# Patient Record
Sex: MALE | Race: WHITE | NOT HISPANIC OR LATINO | Employment: FULL TIME | URBAN - NONMETROPOLITAN AREA
[De-identification: names, ages, dates, MRNs, and addresses within clinical notes are randomized per-mention and may not be internally consistent; named-entity substitution may affect disease eponyms.]

---

## 2017-10-13 ENCOUNTER — TRANSFERRED RECORDS (OUTPATIENT)
Dept: HEALTH INFORMATION MANAGEMENT | Facility: HOSPITAL | Age: 22
End: 2017-10-13

## 2017-10-13 ENCOUNTER — HOSPITAL ENCOUNTER (INPATIENT)
Age: 22
End: 2017-10-13
Payer: MEDICAID

## 2017-10-13 RX ORDER — HYDROXYZINE HYDROCHLORIDE 25 MG/1
25-50 TABLET, FILM COATED ORAL EVERY 4 HOURS PRN
Status: CANCELLED | OUTPATIENT
Start: 2017-10-13

## 2017-10-13 RX ORDER — TRAZODONE HYDROCHLORIDE 50 MG/1
50 TABLET, FILM COATED ORAL
Status: CANCELLED | OUTPATIENT
Start: 2017-10-13

## 2017-10-13 RX ORDER — ACETAMINOPHEN 325 MG/1
650 TABLET ORAL EVERY 4 HOURS PRN
Status: CANCELLED | OUTPATIENT
Start: 2017-10-13

## 2017-10-13 RX ORDER — OLANZAPINE 10 MG/2ML
10 INJECTION, POWDER, FOR SOLUTION INTRAMUSCULAR 3 TIMES DAILY PRN
Status: CANCELLED | OUTPATIENT
Start: 2017-10-13

## 2017-10-13 RX ORDER — OLANZAPINE 10 MG/1
10 TABLET ORAL 3 TIMES DAILY PRN
Status: CANCELLED | OUTPATIENT
Start: 2017-10-13

## 2017-10-13 RX ORDER — ALUMINA, MAGNESIA, AND SIMETHICONE 2400; 2400; 240 MG/30ML; MG/30ML; MG/30ML
30 SUSPENSION ORAL EVERY 4 HOURS PRN
Status: CANCELLED | OUTPATIENT
Start: 2017-10-13

## 2017-10-14 ENCOUNTER — HOSPITAL ENCOUNTER (INPATIENT)
Facility: HOSPITAL | Age: 22
LOS: 4 days | Discharge: HOME | End: 2017-10-18
Attending: PSYCHIATRY & NEUROLOGY | Admitting: PSYCHIATRY & NEUROLOGY
Payer: MEDICAID

## 2017-10-14 ENCOUNTER — TRANSFERRED RECORDS (OUTPATIENT)
Dept: HEALTH INFORMATION MANAGEMENT | Facility: HOSPITAL | Age: 22
End: 2017-10-14

## 2017-10-14 DIAGNOSIS — F33.1 MAJOR DEPRESSIVE DISORDER, RECURRENT EPISODE, MODERATE (H): Primary | ICD-10-CM

## 2017-10-14 PROBLEM — R45.89 SUICIDAL BEHAVIOR: Status: ACTIVE | Noted: 2017-10-14

## 2017-10-14 RX ORDER — TRAZODONE HYDROCHLORIDE 50 MG/1
50 TABLET, FILM COATED ORAL
Status: DISCONTINUED | OUTPATIENT
Start: 2017-10-14 | End: 2017-10-18 | Stop reason: HOSPADM

## 2017-10-14 RX ORDER — OLANZAPINE 10 MG/2ML
10 INJECTION, POWDER, FOR SOLUTION INTRAMUSCULAR
Status: DISCONTINUED | OUTPATIENT
Start: 2017-10-14 | End: 2017-10-18 | Stop reason: HOSPADM

## 2017-10-14 RX ORDER — HYDROXYZINE HYDROCHLORIDE 25 MG/1
25-50 TABLET, FILM COATED ORAL EVERY 4 HOURS PRN
Status: DISCONTINUED | OUTPATIENT
Start: 2017-10-14 | End: 2017-10-18 | Stop reason: HOSPADM

## 2017-10-14 RX ORDER — BISACODYL 10 MG
10 SUPPOSITORY, RECTAL RECTAL DAILY PRN
Status: DISCONTINUED | OUTPATIENT
Start: 2017-10-14 | End: 2017-10-18 | Stop reason: HOSPADM

## 2017-10-14 RX ORDER — ACETAMINOPHEN 325 MG/1
650 TABLET ORAL EVERY 4 HOURS PRN
Status: DISCONTINUED | OUTPATIENT
Start: 2017-10-14 | End: 2017-10-18 | Stop reason: HOSPADM

## 2017-10-14 RX ORDER — OLANZAPINE 10 MG/1
10 TABLET ORAL
Status: DISCONTINUED | OUTPATIENT
Start: 2017-10-14 | End: 2017-10-18 | Stop reason: HOSPADM

## 2017-10-14 RX ORDER — ALUMINA, MAGNESIA, AND SIMETHICONE 2400; 2400; 240 MG/30ML; MG/30ML; MG/30ML
30 SUSPENSION ORAL EVERY 4 HOURS PRN
Status: DISCONTINUED | OUTPATIENT
Start: 2017-10-14 | End: 2017-10-18 | Stop reason: HOSPADM

## 2017-10-14 ASSESSMENT — ACTIVITIES OF DAILY LIVING (ADL)
LAUNDRY: UNABLE TO COMPLETE
RETIRED_EATING: 0-->INDEPENDENT
TRANSFERRING: 0-->INDEPENDENT
BATHING: 0-->INDEPENDENT
GROOMING: INDEPENDENT
FALL_HISTORY_WITHIN_LAST_SIX_MONTHS: NO
COGNITION: 0 - NO COGNITION ISSUES REPORTED
SWALLOWING: 0-->SWALLOWS FOODS/LIQUIDS WITHOUT DIFFICULTY
DRESS: 0-->INDEPENDENT
TOILETING: 0-->INDEPENDENT
AMBULATION: 0-->INDEPENDENT
RETIRED_COMMUNICATION: 0-->UNDERSTANDS/COMMUNICATES WITHOUT DIFFICULTY
ORAL_HYGIENE: INDEPENDENT
DRESS: SCRUBS (BEHAVIORAL HEALTH)

## 2017-10-14 NOTE — PLAN OF CARE
"ADMISSION NOTE    Reason for admission Suicidal Ideation.  Safety concerns OD on ibuprofen.  Risk for or history of violence None reported or noted.   Full skin assessment: Completed, skin clear, intact with exception of Tattoos and small dime of superficial open area on lower back.    Patient arrived on unit from [facility] accompanied by security and transport on 10/14/2017  2:37 PM.   Status on arrival: Ambulatory, pleasant and cooperative.   /62  Pulse 55  Temp 98.5  F (36.9  C) (Tympanic)  Resp 16  Ht 2.108 m (6' 11\")  Wt 67.2 kg (148 lb 1.6 oz)  SpO2 98%  BMI 15.11 kg/m2  Patient given tour of unit and Welcome to  unit papers given to patient, wanding completed, belongings inventoried, and admission assessment initiated.   Patient's legal status on arrival is Voluntary. Appropriate legal rights discussed with and copy given to patient. Patient Bill of Rights discussed with and copy given to patient.   Patient denies SI, HI, and thoughts of self harm and contracts for safety while on unit.      MIKEY FLORES  10/14/2017  3:14 PM          "

## 2017-10-14 NOTE — IP AVS SNAPSHOT
MRN:[id number]                      After Visit Summary   10/14/2017    Ivan Riley    MRN: [id number]           Thank you!     Thank you for choosing [facility] for your care. Our goal is always to provide you with excellent care. Hearing back from our patients is one way we can continue to improve our services. Please take a few minutes to complete the written survey that you may receive in the mail after you visit with us. Thank you!        Patient Information     Date Of Birth          1995        Designated Caregiver       Most Recent Value    Caregiver    Will someone help with your care after discharge? no      About your hospital stay     You were admitted on:  October 14, 2017 You last received care in the:  [facility]    You were discharged on:  October 18, 2017       Who to Call     For medical emergencies, please call 911.  For non-urgent questions about your medical care, please call your primary care provider or clinic, None          Attending Provider     Provider Specialty    Ji Rosenthal MD Psychiatry    [place], Brittney Aragon NP Psychiatry       Primary Care Provider    Physician No Ref-Primary      Further instructions from your care team       Behavioral Discharge Planning and Instructions    Summary: Hesham was admitted to  with increased suicidal ideation     Main Diagnosis:  Mdd, reccurent, severe without psychotic features, Adjustment disorder with depression and anxiety    Major Treatments, Procedures and Findings: Stabilize with medications, connect with community programs.    Symptoms to Report: feeling more aggressive, increased confusion, losing more sleep, mood getting worse or thoughts of suicide    Lifestyle Adjustment: Take all medications as prescribed, meet with doctor/ medication provider, out patient therapist, , and [facility] worker as scheduled. Abstain from alcohol or any unprescribed drugs.    Psychiatry Follow-up:     [facility]  Dr. Baltazar -   [phone] Fax: [phone]    [facility]  ARM -   [address] [place] [address]  [phone]  Fax: [phone]    [facility] - Open Access/Walk ins Monday 1-3:30 Tuesday/Thursday 8:30-11:30 Wednesday 12:30-3:30  Therapy - Ji Cowan   [facility]  [address] [place] [address]  Phone:  [phone]  Fax: [phone]      Resources:   Crisis Intervention: 815.987.5629 or 172-136-2123 (TTY: 462.124.8802).  Call anytime for help.  National [place] on Mental Illness (www.mn.lili.org): 109.523.5633 or 578-353-3628.  Alcoholics Anonymous (www.alcoholics-anonymous.org): Check your phone book for your local chapter.  Suicide Awareness Voices of Education (SAVE) (www.save.org): 708-639-QVXU (5135)  National Suicide Prevention Line (www.mentalhealthmn.org): 796-824-ZYVQ (8174)  Mental Health Consumer/Survivor Network of [place] (www.mhcsn.net): 244.751.8498 or 190-630-0075  Mental Health Association of [place] (www.mentalhealth.org): 411.264.9032 or 366-307-5813    General Medication Instructions:   See your medication sheet(s) for instructions.   Take all medicines as directed.  Make no changes unless your doctor suggests them.   Go to all your doctor visits.  Be sure to have all your required lab tests. This way, your medicines can be refilled on time.  Do not use any drugs not prescribed by your doctor.  Avoid alcohol.    [place] Area:  [facility], Crisis stabilization [facility]- [phone]  [facility] Crisis Line: 8-305-965-0658  Advocates For Family Peace: 152-5479  Sexual Assault Program [facility]: 742.372.7714 or 1-977.629.3783  [place] Battered Women's Program: 5-047-434-8166 Ext: 279       Calls answered Mon-Fri-8:00 am--4:30 pm    [place]:  Advocates for Family Peace: 1-481.182.1547  [facility] first call for help: 1-341.912.3392  [facility]:  (776) 928-1319      [place] Area:  Warm Line: 1-357.326.2204        "Calls answered Tuesday--Saturday 4:00 pm--10:00 pm  [facility] Crisis Line - 271-064-6323  [place] [facility]abil[facility] 108-313-9838    [place] Statewide:  [place] Crisis and Referral Services: 1-204.140.1279  National Suicide Prevention Lifeline: 2-170-663-TALK (3120)   - tlq9ugba- Text  Life  to [address]  First Call for Help:   [facility] Helpline- 8-332-GHLX-HELP      Pending Results     No orders found from 10/12/2017 to 10/15/2017.            Statement of Approval     Ordered          10/17/17 1502  I have reviewed and agree with all the recommendations and orders detailed in this document.  EFFECTIVE NOW     Approved and electronically signed by:  Christiano Cowan NP             Admission Information     Date & Time Department Dept. Phone    10/14/2017 [facility] [phone]      Your Vitals Were     Blood Pressure Pulse Temperature Respirations Height Weight    111/51 55 97.7  F (36.5  C) (Tympanic) 12 2.108 m (6' 11\") 66.6 kg (146 lb 13.2 oz)    Pulse Oximetry BMI (Body Mass Index)                98% 14.98 kg/m2          MyChart Information     [employer] lets you send messages to your doctor, view your test results, renew your prescriptions, schedule appointments and more. To sign up, go to [email or web address] . Click on \"Log in\" on the left side of the screen, which will take you to the Welcome page. Then click on \"Sign up Now\" on the right side of the page.     You will be asked to enter the access code listed below, as well as some personal information. Please follow the directions to create your username and password.     Your access code is: [email or web address]  Expires: 2018  9:22 AM     Your access code will  in 90 days. If you need help or a new code, please call your [facility] clinic or [phone].        Care EveryWhere ID     This is your Care EveryWhere ID. This could be used by other organizations to access your [facility] medical records  [id number]        Equal Access to Services     " LINH GORDON : Hadii aad ku janak Joyce, waaxda luqadaha, qaybta kaalmada adejocelyne, waxsimon abdoul duartetellygail arias. So [facility] [phone].    ATENCIÓN: Si habla español, tiene a yoo disposición servicios gratuitos de asistencia lingüística. Llame al [phone].    We comply with applicable federal civil rights laws and [place] laws. We do not discriminate on the basis of race, color, national origin, age, disability, sex, sexual orientation, or gender identity.               Review of your medicines      START taking        Dose / Directions    FLUoxetine 20 MG capsule   Commonly known as:  PROzac   Used for:  Major depressive disorder, recurrent episode, moderate (H)        Dose:  20 mg   Take 1 capsule (20 mg) by mouth daily   Quantity:  30 capsule   Refills:  0            Where to get your medicines      These medications were sent to [facility] - [facility] [address]  [address] [facility] [address]     Phone:  [phone]     FLUoxetine 20 MG capsule                Protect others around you: Learn how to safely use, store and throw away your medicines at www.disposemymeds.org.             Medication List: This is a list of all your medications and when to take them. Check marks below indicate your daily home schedule. Keep this list as a reference.      Medications           Morning Afternoon Evening Bedtime As Needed    FLUoxetine 20 MG capsule   Commonly known as:  PROzac   Take 1 capsule (20 mg) by mouth daily   Last time this was given:  20 mg on 10/18/2017  8:09 AM

## 2017-10-14 NOTE — PLAN OF CARE
Face to face end of shift report received from Patricia DOBSON RN. Rounding completed. Patient observed in room awake.     Solange Marsh  10/14/2017  3:54 PM

## 2017-10-14 NOTE — IP AVS SNAPSHOT
[facility]    [address] [place] [address]    Phone:  [phone]    Fax:  [phone]                                       After Visit Summary   10/14/2017    Ivan Riley    MRN: [id number]           After Visit Summary Signature Page     I have received my discharge instructions, and my questions have been answered. I have discussed any challenges I see with this plan with the nurse or doctor.    ..........................................................................................................................................  Patient/Patient Representative Signature      ..........................................................................................................................................  Patient Representative Print Name and Relationship to Patient    ..................................................               ................................................  Date                                            Time    ..........................................................................................................................................  Reviewed by Signature/Title    ...................................................              ..............................................  Date                                                            Time

## 2017-10-14 NOTE — PROVIDER NOTIFICATION
10/14/17 1456   Patient Belongings   Did you bring any home meds/supplements to the hospital?  No   Patient Belongings cell phone/electronics;clothing;money (see comment);shoes;wallet   Disposition of Belongings Other (see comment)   Belongings Search Yes   Clothing Search Yes   Second Staff Abel   General Info Comment 1 pair of black tenners, 1 green t shirt, 1 plaid shirt, 1 brown zip up hoodie, 12 pairs of sox,1 belt, 1 black hoodie, 6 pair of boxers, 1 red t shirt,1 brown hoodie, 2 pair of faded glory jeans, 1 pair of black jeans, 1 black sweat shirt, 1 green t shirt, 1 black t shirt, 1 grey t shirt    List items sent to safe: 1 cell phone and , 1 wallet, 19 cash,1 [employer] card, 1 visa debit card, 1 visa [place] bank card, another cell phone.one cell phone has a scrathed face  All other belongings put in assigned cubby in belongings room.     I have reviewed my belongings list on admission and verify that it is correct.     Patient signature_______________________________    Second staff witness (if patient unable to sign) ______________________________       I have received all my belongings at discharge.    Patient signature________________________________    Julissa   10/14/2017  3:00 PM

## 2017-10-15 PROCEDURE — 99223 1ST HOSP IP/OBS HIGH 75: CPT | Performed by: NURSE PRACTITIONER

## 2017-10-15 RX ADMIN — FLUOXETINE 20 MG: 20 CAPSULE ORAL at 12:05

## 2017-10-15 ASSESSMENT — ACTIVITIES OF DAILY LIVING (ADL)
DRESS: SCRUBS (BEHAVIORAL HEALTH)
LAUNDRY: UNABLE TO COMPLETE
LAUNDRY: UNABLE TO COMPLETE
ORAL_HYGIENE: INDEPENDENT
GROOMING: INDEPENDENT
GROOMING: INDEPENDENT
ORAL_HYGIENE: INDEPENDENT
DRESS: SCRUBS (BEHAVIORAL HEALTH)

## 2017-10-15 NOTE — PLAN OF CARE
Problem: Patient Care Overview  Goal: Plan of Care/Patient Progress Review  Outcome: No Change  Patient calm and cooperative with nursing assessment and finishing his admission documents.  He has a sad, flat affect and is tearful at times.  States he does not have much to live for as he does not currently have a home and does not see his 18 month old son.  He does think things would improve if he had his own place and a steady income.  States his depression has been getting worse but is not currently having thoughts of suicide.  Feels he will be safe while on the unit.  Patient states he is not currently taking any medications but has been prescribed antidepressants in the past. Denies any hallucinations and has some issues with anxiety.  [facility].  Reports he has a history of self-injury but has not had those impulses in a long time.  VS WNL.  Denies any pain.

## 2017-10-15 NOTE — PLAN OF CARE
Face to face end of shift report received from Solange PULIDO. Rounding completed. Patient observed.     Joaquina Head  10/14/2017  11:22 PM

## 2017-10-15 NOTE — PLAN OF CARE
Face to face end of shift report received from Patricia DOBSON RN. Rounding completed. Patient observed in group room.     Solange Marsh  10/15/2017  3:50 PM

## 2017-10-15 NOTE — PLAN OF CARE
Problem: Patient Care Overview  Goal: Plan of Care/Patient Progress Review  Pt pleasant and cooperative during nurse assessment. Appears blunt at times but is social with peers and staff. Laughs and smiles accordingly. Patient participating in groups and is compliant with ordered medications. Prozac handout with explanation given. EKG performed without difficulties, waiting final results. Denies chest pain or palpitations but states has irregular heart rhythm. Denies pain or thoughts to hurt self. Will continue to monitor.

## 2017-10-15 NOTE — H&P
"Psychiatric Eval/H&P    Patient Name: Ivan Riley   YOB: 1995  Age: 21 year old  [id number]    Primary Physician: No Ref-Primary, Physician   Completed by VALENTINA Gambino  : None  ARHMS: None  Primary psychiatrist/NP: None  Therapist: None  Family contact: Mother Vicenta [phone]          CC: \"I haven't been able to see my son and I don't     HPI   Ivan Riley is a 21 year old never   male who Overdose on 100, 200 mg tablets of ibuprofen. She was monitored in the emergency room and they contacted poison control for clearance.    He is depressed and hopeless he feels he has nothing to live for.  He is not able to see his 18-month-old son.  He has a history of self-injurious behavior.  He used to cut his arms to relieve stress  Though he has not done that in many years.  He states that he has had stressors lately.   He has not been able to find a job  That is steady Enough to pay child support.  The last time he went to try to see his son states \"before she would let me see him, she drug to all of her male friends houses\" he is aware that this was likely to induce jealousy. He states that he is typically not depressed and that he is never had suicidal thoughts though When he and his girlfriend broke up h started.  It is now been going on since June. Patient has had              depressed mood (sad empty hopeless tearful),               ahedonia              low energy              Worthlessness And guilt              concentration decrease              Suicidal thoughts with plan action and intent     He states he has been feeling guilty because his ex girlfriend posted something on [employer] about how he is a \"deadbeat dad\"  [facility]     Past Psychiatric History: At the age of 17 he did see a therapist after he ran away.     Social History: He was born in [place] though at the age of 1-1/2 he states his mother  Left his father and came to [place] to  Be away " "from him.  He has not seen his father since.  He states that he has no history   He does have a 13-year-old half-sister.  He states that he was also raised by his maternal grandparents.  When He moved to [place]  He and his mother lived in a trailer that was on the same land as their grandparents home. He states that he was very close to his  Grandparents and still is.  At the age of 14 he and his mother moved into their own home.  He did graduate high school and attended some college for carpentry but did not complete a degree.  He states he is very involved in music and plays the guitar and piano.  He states that at the age of 16 he ran away for a week because he did not want to go to school because he was bullied. He states that he is still very close to his mother.   He does have an ex-girlfriend that he was with for 3 years.  He has an 49-ytlie-dfx son with her.  They live in [place]. 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    Chemical Use History: He has never Been intoxicated or used drugs     Family Psychiatric History: Cousin did commit suicide     Medical History and ROS    \"I have been told that I had something wrong with my heart an echocardiogram\"    No current outpatient prescriptions on file.     No Known Allergies  No past medical history on file.  No past surgical history on file.    Current Medications None  No current outpatient prescriptions on file.       Past Psychiatric Medications Tried:He states that he tried Prozac when he was 19  And My girlfriend's family said I was much better when I was on it and acted much less depressed.    Physical Exam    Constitutional: oriented to person, place, and time.  appears well-developed and well-nourished.   HENT:   Head: Normocephalic and atraumatic.   Right Ear: External ear normal.   Left Ear: External ear normal.   Nose: Nose normal.   Mouth/Throat: Oropharynx is clear and moist. No oropharyngeal " "exudate.   Eyes: Conjunctivae and EOM are normal. Pupils are equal, round, and reactive to light. Right eye exhibits no discharge. Left eye exhibits no discharge. No scleral icterus.   Neck: Normal range of motion. Neck supple. No JVD present. No tracheal deviation present. No thyromegaly present.   Cardiovascular: irregular rhythm and bradycardic,  intact distal pulses. Exam reveals no gallop and no friction rub.   No murmur heard.  Pulmonary/Chest: Effort normal and breath sounds normal. No stridor. No respiratory distress.  no wheezes. no rales. no tenderness.   Abdominal: Soft. Bowel sounds are normal.  no distension and no mass. There is no tenderness. There is no rebound and no guarding.  Skin: Dry, intact, no open areas, rashes, moles of concern    Review of Systems:  Constitution: No weight loss, fever, night sweats  Skin: No rashes, pruritus or open wounds  Neuro: No headaches or seizure activity.  Psych:  See HPI  Eyes: No vision changes.  ENT: No problems chewing or swallowing.   Musculoskeletal: No muscle pain, joint pain or swelling   Respiratory: No cough or dyspnea  Cardiovascular:  No chest pain,  palpitations or fainting  Gastrointestinal:  No abdominal pain, nausea, vomiting or change in bowel habits         MSE/PSYCH  PSYCHIATRIC EXAM  BP (!) 115/49  Pulse 56  Temp 98.5  F (36.9  C) (Tympanic)  Resp 12  Ht 2.108 m (6' 11\")  Wt 66.6 kg (146 lb 13.2 oz)  SpO2 97%  BMI 14.98 kg/m2  -Appearance/Behavior: Well-groomed  -Motor: Intact  -Gait:Intact  -Abnormal involuntary movements: None  -Mood: Sad dysphoric  -Affect: Dysphoric  -Speech: Regular low monotone       -Thought process/associations: Logical.  -Thought content: no delusions  -Perceptual disturbances: No hallucinations..              -Suicidal/Homicidal Ideation:   -Judgment: Poor.  -Insight: Fair  *Orientation: time, place and person.  *Memory: Intact  *Attention: Good  *Language: fluent, no aphasias, able to repeat phrases and name " objects. Vocab intact.  *Fund of information: appropriate for education  *Cognitive functioning estimate: 0 - independent.     Labs: cmp did show elevated sodium. lfts were normal. Drug screen negative             Alcohol negative,             ekg showed prolonged qt though it is now improved     Assessment/Impression: This is a 21 year old yo male with Adjustment disorder as well as depression.  He was having suicidal thoughts and did attempt to overdose on ibuprofen.  This was his first suicide attempt.  This is the first time he has been on a mental health unit.  Educated regarding medication indications, risks, benefits, side effects, contraindications and possible interactions. Verbally expressed understanding.     DX:     Mdd, reccurent, severe without psychotic features  Adjustment disorder with depression and anxiety       Plan:     prozac 20 mg daily. States he tried it in past and family had told him he looked much better on it  Vitamin D level  EKG will be ordered  Prior to DC I would like to set him up with echocardiogram. He has been recommended to have one on multiple occasions though has never made the appt.      Anticipated length of stay 5 days

## 2017-10-15 NOTE — PLAN OF CARE
Face to face end of shift report received from TESS Kunz. Rounding completed. Patient observed in dayroom.     MIKEY FLORES  10/15/2017  7:50 AM

## 2017-10-16 DIAGNOSIS — F32.1 MAJOR DEPRESSIVE DISORDER, SINGLE EPISODE, MODERATE (H): Primary | ICD-10-CM

## 2017-10-16 PROCEDURE — 82306 VITAMIN D 25 HYDROXY: CPT | Performed by: NURSE PRACTITIONER

## 2017-10-16 PROCEDURE — 36415 COLL VENOUS BLD VENIPUNCTURE: CPT | Performed by: NURSE PRACTITIONER

## 2017-10-16 PROCEDURE — 12400000 ZZH R&B MH

## 2017-10-16 PROCEDURE — 99232 SBSQ HOSP IP/OBS MODERATE 35: CPT | Performed by: NURSE PRACTITIONER

## 2017-10-16 RX ADMIN — FLUOXETINE 20 MG: 20 CAPSULE ORAL at 08:21

## 2017-10-16 ASSESSMENT — ACTIVITIES OF DAILY LIVING (ADL)
ORAL_HYGIENE: INDEPENDENT
DRESS: INDEPENDENT;SCRUBS (BEHAVIORAL HEALTH)
DRESS: INDEPENDENT
GROOMING: INDEPENDENT
ORAL_HYGIENE: INDEPENDENT
LAUNDRY: UNABLE TO COMPLETE
LAUNDRY: UNABLE TO COMPLETE
GROOMING: INDEPENDENT

## 2017-10-16 NOTE — PLAN OF CARE
Problem: Patient Care Overview  Goal: Plan of Care/Patient Progress Review  Patient will deny suicidal thoughts by discharge.  Patient will attend 75% of group programing.  Patient will verbalize decrease in depression.   Outcome: Improving  Patient has been calm and cooperative this shift.  No medications given this shift.  He has been attending groups and is social with peers and staff in the [facility].  He states he is still feeling depressed and has some anxiety but denies any thoughts of suicide at this time.  Still awaiting  results from EKG that was completed this morning.  Patient has not complaints of pain and VS are stable.  Will continue to monitor.

## 2017-10-16 NOTE — PLAN OF CARE
Face to face end of shift report received from Loco DAS RN. Rounding completed. Patient observed in [facility].     Solange Marsh  10/16/2017  3:58 PM

## 2017-10-16 NOTE — PLAN OF CARE
Problem: Patient Care Overview  Goal: Team Discussion  Team Plan:   Outcome: No Change  BEHAVIORAL TEAM DISCUSSION     Participants: Lg Taylor RN, Leyla Mejia RT, Carolyn Ordonez OT, Brittney Jones NP, Paula Pena NP, Miguelina Casillas SW, Amee Powell SW  Progress: Engaging with staff, cooperative, attending groups  Continued Stay Criteria/Rationale: serious suicide attempt - stabilizing suicidal ideation with medication changes  Medical/Physical: History of heart issues  Precautions:   Behavioral Orders   Procedures     Code 1 - Restrict to Unit     Routine Programming       As clinically indicated     Status 15       Every 15 minutes.     Plan: Stabilize on medications - can return home once stable with services, possible referral for PHP  Rationale for change in precautions or plan: None

## 2017-10-16 NOTE — PLAN OF CARE
Face to face end of shift report received from Solange PULIDO. Rounding completed. Patient observed.     Joaquina Head  10/15/2017  11:26 PM

## 2017-10-16 NOTE — PLAN OF CARE
"Problem: Patient Care Overview  Goal: Plan of Care/Patient Progress Review  Patient will deny suicidal thoughts by discharge.  Patient will attend 75% of group programing.  Patient will verbalize decrease in depression.   Pt denies SI, HI, and hallucinations. He is calm and cooperative with nursing assessment. Affect is blunted and flat. Sarabjit anxiety and depression. Feels like he is ready to go home. Wants to get back to work. Rates depression as \"a little bit.\" Denies pain. Has been up on the unit socializing with peers and attending groups. He is compliant with prescribed medication. Will continue to monitor.        "

## 2017-10-16 NOTE — PLAN OF CARE
Face to face end of shift report received from TESS Kunz. Rounding completed. Patient observed in bed.     Adrienne Marie  10/16/2017  7:47 AM

## 2017-10-16 NOTE — PLAN OF CARE
"Social Service Psychosocial Assessment  Presenting Problem:  Ivan is a single, 21 year-old,  male who presented to [facility] ED following an overdose attempt.  According to admission notes, \"Pt reports that he took around 100 over the counter ibuprofen tablets at around 0100. Pt continues to endorse SI and appear Pt denies drug and alcohol use. Labs are clear and pt is medically stable. Pt is voluntary.\"  Marital Status:   Single, never   Spouse / Children:  Has an 18 may-old son has not been able to see.  Psychiatric TX HX: History of depression.   Suicide Risk Assessment: On admission pt reported attempted overdose.  Today pt denies SI.   Pt has a history of SIBs but cutting but states has not done this in a long time and denies any prior attempts.   Access to Lethal Means (explain): Denies access to firearms.   Family Psych HX: A cousin committed suicide. Pt stated that his mother has some issues but unsure of any diagnosis.  Pt denies any substance use issues in the family.   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   Living Situation:  Lives with a roommate in [place].  ADL s: independent  Education: Graduated high school, some college  Financial Situation:  No source of income- does not want to be on general assistance  Occupation: Unemployed  Leisure & Recreation: Playing guitar, piano, drums and skateboarding.   Childhood History:Pt was born in [place] and lived there until he was 1 1/2 when his mother left his father and brought him to [place].  He has not had any contact with his father since then.  Has a 13 year-old half sister. He lived with his mom in a trailer house on his maternal grandparents " land until he was 14 when they moved into their own house. Pt reports being close to his mother.   Trauma Abuse HX: Pt reported he was bullied in school and ran away from home for a week when he was 16 year-old because he was being bullied.  Relationship / Sexuality:  No current relationship  Substance Use/ Abuse: Pt denies ever using drugs or drinking.   Chemical Dependency Treatment HX:  none  Legal Issues: denies  Significant Life Events:  Was target of severe bullying in school.   Strengths:  Willing to accept help, hard worker  Challenges /Limitation:  Depression symptoms,   Patient Support Contact (Include name, relationship, number, and summary of conversation):     Interventions:      Community-Based Programs: Will do [facility] referral    Medical/Dental Care: Refer to Dr. Baltazar for PCP    Medication Management: PCP    Individual Therapy: Wants to see Ji Cowan at [facility] counseling again.     Suicide Risk Assessment  On admission pt reported attempted overdose.  Today pt denies SI.   Pt has a history of SIBs but cutting but states has not done this in a long time and denies any prior attempts.     High Risk Safety Plan: Talk to supports; Call crisis lines; Go to local ER if feeling suicidal.

## 2017-10-16 NOTE — PLAN OF CARE
Problem: Patient Care Overview  Goal: Individualization & Mutuality  Pt appeared to be sleeping throughout this shift, normal respirations and position changes noted.

## 2017-10-17 LAB — DEPRECATED CALCIDIOL+CALCIFEROL SERPL-MC: 34 UG/L (ref 20–75)

## 2017-10-17 PROCEDURE — 99238 HOSP IP/OBS DSCHRG MGMT 30/<: CPT | Performed by: NURSE PRACTITIONER

## 2017-10-17 PROCEDURE — 25000132 ZZH RX MED GY IP 250 OP 250 PS 637: Performed by: NURSE PRACTITIONER

## 2017-10-17 PROCEDURE — 12400000 ZZH R&B MH

## 2017-10-17 RX ADMIN — FLUOXETINE 20 MG: 20 CAPSULE ORAL at 08:18

## 2017-10-17 ASSESSMENT — ACTIVITIES OF DAILY LIVING (ADL)
GROOMING: INDEPENDENT
GROOMING: INDEPENDENT
ORAL_HYGIENE: INDEPENDENT
LAUNDRY: UNABLE TO COMPLETE
DRESS: SCRUBS (BEHAVIORAL HEALTH);INDEPENDENT
LAUNDRY: UNABLE TO COMPLETE
DRESS: INDEPENDENT;SCRUBS (BEHAVIORAL HEALTH)
ORAL_HYGIENE: INDEPENDENT

## 2017-10-17 NOTE — PLAN OF CARE
Face to face end of shift report received from Adrienne DAS RN. Rounding completed. Patient observed in [facility].    Pam Grayson  10/17/2017  3:36 PM

## 2017-10-17 NOTE — PLAN OF CARE
Problem: Patient Care Overview  Goal: Discharge Needs Assessment  Writer checked in with pt who stated he was doing better.  Writer asked him if he would be interested in going to the [facility] program at [facility] three days a week?  Pt stated his main priority is to find a ful ltime job and he was unsure if he would be able to do the program unless he could work it around his work hours.  Writer told him that we have not been able to set up any follow up appointments because his insurance is not active yet- he stated that pt financial met with him again today to get him signed up.  Writer told him that all the phone numbers for follow up appointments will be on his discharge plan and if he doesn't have insurance before discharge he will have to call and set up his appointments.  Pt stated he could do that- he is used to setting up his own appointments.  Pt was doing a puzzle which he stated was the same one he did with his grandmother before his grandmother passed away. Pt was attending groups, bright and socializing with the other pts on the unit.  He denied any SI.

## 2017-10-17 NOTE — PLAN OF CARE
Problem: Patient Care Overview  Goal: Plan of Care/Patient Progress Review  Patient will deny suicidal thoughts by discharge.  Patient will attend 75% of group programing.  Patient will verbalize decrease in depression.   Outcome: Improving  Patient has been calm and cooperative.  Continues to have a flat affect and appears sad.  Denies any thoughts of suicide this shift. States he is still depressed but it is better then when he was admitted here.  He attended groups and was social with peers on the unit. Pulse and blood pressure WNL this shift.  Denies any pain.  Will continue to monitor.

## 2017-10-17 NOTE — PLAN OF CARE
Face to face end of shift report received from TESS Heredia. Rounding completed. [facility] in [facility].     Adrienne Marie  10/17/2017  7:48 AM

## 2017-10-17 NOTE — PLAN OF CARE
Face to face end of shift report received from Solange JORDAN RN. Rounding completed. Patient observed sleeping in bed.    Giovanna Murphy  10/17/2017  12:23 AM

## 2017-10-17 NOTE — PLAN OF CARE
Problem: Patient Care Overview  Goal: Team Discussion  Team Plan:   BEHAVIORAL TEAM DISCUSSION     Participants: Brittney Jones NP, Paula Pena NP,  Judy Casillas LICSW, Amee Powell LICSW, Tiffanie Rodriguez Discharge Plannner, Cesia Baum RN,  Lg Taylor RN. Leyla Louie Recreation Therapy, Carlita Ordonez OT   Progress: MInimal, agreeable to treatmetn plan  Continued Stay Criteria/Rationale: Recent overdose. Provider to see and assess for medication changes.  Medical/Physical:  Precautions:   Behavioral Orders   Procedures     Code 1 - Restrict to Unit     Routine Programming       As clinically indicated     Status 15       Every 15 minutes.     Plan: Continued medication stabilization  Rationale for change in precautions or plan: None

## 2017-10-17 NOTE — PLAN OF CARE
"Problem: Patient Care Overview  Goal: Plan of Care/Patient Progress Review  Patient will deny suicidal thoughts by discharge.  Patient will attend 75% of group programing.  Patient will verbalize decrease in depression.   Pt denies SI, HI, and hallucinations. Says he slept well last night. Continues to endorse in depression but says, \"its getting better.\" Affect is blunted and flat. He is pleasant and cooperative with nursing assessment. Socialized with peers and attends groups. Is appropriate with peers. Denies anxiety and pain. Compliant with prescribed medication. Will continue to monitor.       "

## 2017-10-17 NOTE — PLAN OF CARE
Problem: Patient Care Overview  Goal: Individualization & Mutuality  Patient will deny suicidal thoughts by discharge.  Patient will attend 75% of group programing.  Patient will verbalize decrease in depression.   Outcome: No Change  Patient denies SI, HI, hallucinations, pain, depression, anxiety.  He states that he feels less hopeless and depressed than he did on admission.  He is attending most groups this shift.  He is calm and cooeprative with assessments. He maintains eye contact.  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  He does state that there will be more jobs available to him in the future.  Notified patient of discharge order in computer.  Mother was here to visit this shift and she states she will be at ER doors at 1100.

## 2017-10-17 NOTE — DISCHARGE SUMMARY
Psychiatric Discharge Summary    Ivan Riley MRN# [id number]   Age: 21 year old YOB: 1995     Date of Admission:  10/14/2017  Date of Discharge:  10/18/2017  Admitting Physician:  Ji Rosenthal MD  Discharge Physician:  Christiano Cowan NP          Event Leading to Hospitalization and Hospital Stay   Ivan Riley is a 21 year old never   male who Overdose on 100, 200 mg tablets of ibuprofen. He was monitored in the emergency room and they contacted poison control for clearance.       Admitted to unit voluntarily. Started on Prozac 20mg daily. Vitamin D level ordered and reviewed. WNL. Provided safe and secure environment. Encouraged participation in unit activity. Referrals for PHP and/or day programming in [place], [place].      Our team has made contact with Ivan's mother. to ensure readiness for discharge.     At time of discharge, there is no evidence that patient is in immediate danger of self or others.        DIagnoses:     Mdd, reccurent, severe without psychotic features  Adjustment disorder with depression and anxiety         Labs:     Results for orders placed or performed during the hospital encounter of 10/14/17   Vitamin D   Result Value Ref Range    Vitamin D Deficiency screening 34 20 - 75 ug/L            Discharge Medications:     Current Discharge Medication List      START taking these medications    Details   FLUoxetine (PROZAC) 20 MG capsule Take 1 capsule (20 mg) by mouth daily  Qty: 30 capsule, Refills: 0    Associated Diagnoses: Major depressive disorder, recurrent episode, moderate (H)                Psychiatric Examination:   Appearance:  awake, alert and adequately groomed  Attitude:  cooperative  Eye Contact:  good  Mood:  better  Affect:  mood congruent  Speech:  clear, coherent  Psychomotor Behavior:  no evidence of tardive dyskinesia, dystonia, or tics  Thought Process:  logical, linear and goal oriented  Associations:  no loose  associations  Thought Content:  no evidence of suicidal ideation or homicidal ideation and no evidence of psychotic thought  Insight:  fair  Judgment:  limited  Oriented to:  time, person, and place  Attention Span and Concentration:  intact  Recent and Remote Memory:  intact  Fund of Knowledge: appropriate  Muscle Strength and Tone: normal  Gait and Station: Normal  Perception: No perceptual disorder noted.        Discharge Plan:   Discharge home with services. Patient to arrange transport (mother). Medication prescriptions sent to [facility] in [place] per request.   Psychiatry Follow-up:      [facility]  Dr. Baltazar -   [phone] Fax: [phone]     [facility] -   [address] [place] [address]  [phone]  Fax: [phone]     [facility] - Open Access/Walk ins Monday 1-3:30 Tuesday/Thursday 8:30-11:30 Wednesday 12:30-3:30  Therapy - Ji Cowan   [address] [place], [place]  Phone:  [phone]  Fax: [phone]     Attestation:  The patient has been seen and evaluated by me,  Christiano Cowan, NP         Discharge Services Provided:   20 minutes spent on discharge services, including:  Final examination of patient.  Review and discussion of Hospital stay.  Instructions for continued outpatient care/goals.  Preparation of discharge records.  Preparation of medications refills and new prescriptions.  Preparation of Applicable referral forms.

## 2017-10-17 NOTE — PLAN OF CARE
Problem: Patient Care Overview  Goal: Plan of Care/Patient Progress Review  Patient will deny suicidal thoughts by discharge.  Patient will attend 75% of group programing.  Patient will verbalize decrease in depression.   Outcome: Therapy, progress toward functional goals is gradual  Pt appeared to be sleeping throughout this shift. Pt had normal respirations and position changes noted. Will continue to monitor.

## 2017-10-18 VITALS
HEART RATE: 55 BPM | HEIGHT: 78 IN | DIASTOLIC BLOOD PRESSURE: 51 MMHG | TEMPERATURE: 97.7 F | BODY MASS INDEX: 16.99 KG/M2 | RESPIRATION RATE: 12 BRPM | OXYGEN SATURATION: 98 % | WEIGHT: 146.83 LBS | SYSTOLIC BLOOD PRESSURE: 111 MMHG

## 2017-10-18 PROCEDURE — 25000132 ZZH RX MED GY IP 250 OP 250 PS 637: Performed by: NURSE PRACTITIONER

## 2017-10-18 RX ADMIN — FLUOXETINE 20 MG: 20 CAPSULE ORAL at 08:09

## 2017-10-18 ASSESSMENT — ACTIVITIES OF DAILY LIVING (ADL)
DRESS: INDEPENDENT;SCRUBS (BEHAVIORAL HEALTH)
GROOMING: INDEPENDENT
ORAL_HYGIENE: INDEPENDENT
LAUNDRY: UNABLE TO COMPLETE

## 2017-10-18 NOTE — PLAN OF CARE
"Problem: Patient Care Overview  Goal: Individualization & Mutuality  Patient will deny suicidal thoughts by discharge.  Patient will attend 75% of group programing.  Patient will verbalize decrease in depression.   Patient presents with full range affect and is pleasant this morning. Patient denies having suicidal ideation, homicidal ideation, anxiety, or depression. Pt states he is feeling like medications are working and feels \"good overall.\" He denies having bowel or bladder issues. He denies pain. He states he is looking forward to discharge.       "

## 2017-10-18 NOTE — PLAN OF CARE
Problem: Patient Care Overview  Goal: Individualization & Mutuality  Patient will deny suicidal thoughts by discharge.  Patient will attend 75% of group programing.  Patient will verbalize decrease in depression.   Outcome: Therapy, progress toward functional goals is gradual  Pt appeared to be sleeping throughout this shift. Pt had normal respirations and position changes noted. Will continue to monitor

## 2017-10-18 NOTE — PLAN OF CARE
Problem: Patient Care Overview  Goal: Discharge Needs Assessment  Pt is discharging at the recommendation of the treatment team. Pt is discharging to home transported by his mother. Pt denies having any thoughts of hurting themself or anyone else. Pt denies anxiety or depression. Pt will set up his own follow-up appointment when his insurance is active. Discharge instructions, including; demographic sheet, psychiatric evaluation, discharge summary, and AVS were faxed to these next level of care providers.

## 2017-10-18 NOTE — DISCHARGE INSTRUCTIONS
Behavioral Discharge Planning and Instructions    Summary: Hesham was admitted to  with increased suicidal ideation     Main Diagnosis:  Mdd, reccurent, severe without psychotic features, Adjustment disorder with depression and anxiety    Major Treatments, Procedures and Findings: Stabilize with medications, connect with community programs.    Symptoms to Report: feeling more aggressive, increased confusion, losing more sleep, mood getting worse or thoughts of suicide    Lifestyle Adjustment: Take all medications as prescribed, meet with doctor/ medication provider, out patient therapist, , and [facility] worker as scheduled. Abstain from alcohol or any unprescribed drugs.    Psychiatry Follow-up:     [facility]  Dr. Baltazar -   [phone] Fax: [phone]    [facility] -   [address] [place] [address]  [phone]  Fax: [phone]    [facility] - Open Access/Walk ins Monday 1-3:30 Tuesday/Thursday 8:30-11:30 Wednesday 12:30-3:30  Therapy - Ji Cowan   [facility]  [address] [place] [address]  Phone:  [phone]  Fax: [phone]      Resources:   Crisis Intervention: 144.501.1900 or 080-189-5603 (TTY: 774.110.2187).  Call anytime for help.  National [place] on Mental Illness (www.[place].lili.org): 179.104.5228 or 768-181-0486.  Alcoholics Anonymous (www.alcoholics-anonymous.org): Check your phone book for your local chapter.  Suicide Awareness Voices of Education (SAVE) (www.save.org): 366-781-IIJJ (1449)  National Suicide Prevention Line (www.mentalhealthmn.org): 759-493-GXMS (4500)  Mental Health Consumer/Survivor Network of [place] (www.mhcsn.net): 790.881.6725 or 724-759-3861  Mental Health Association of [place] (www.mentalhealth.org): 773.618.2471 or 100-965-9429    General Medication Instructions:   See your medication sheet(s) for instructions.   Take all medicines as directed.  Make no changes unless your doctor suggests them.   Go to all your  doctor visits.  Be sure to have all your required lab tests. This way, your medicines can be refilled on time.  Do not use any drugs not prescribed by your doctor.  Avoid alcohol.    Range Area:  [facility], Crisis stabilization [facility]- [phone]  [facility] Crisis Line: 1-453.564.5996  Advocates For Family Peace: 994-2723  Sexual Assault Program of [facility]: 293.820.1895 or 1-806.295.7957  [place] Battered Women's Program: 1-829.210.3300 Ext: 279       Calls answered Mon-Fri-8:00 am--4:30 pm    [place]:  Advocates for Family Peace: 1-666.250.4168  [facility] first call for help: 1-723.544.6607  [facility] Crisis Center:  (725) 344-3856      [place] Area:  Warm Line: 1-309.842.4039       Calls answered Tuesday--Saturday 4:00 pm--10:00 pm  [facility] Crisis Line - 333.972.2371  [place] Crisis Stabilization 794-047-2076    [place] Statewide:  [place] Crisis and Referral Services: 1-500.355.3940  National Suicide Prevention Lifeline: 8-254-645-TALK (5076)   - nsw4ywue- Text  Life  to 95248  First Call for Help: 2-1-1  [facility] Helpline- 1-914-NTLC-HELP

## 2017-10-18 NOTE — PLAN OF CARE
Discharge Note    Patient Discharged to home on 10/18/2017 10:51 AM via Private Car accompanied by Discharge Planner.     Patient informed of discharge instructions in AVS. patient verbalizes understanding and denies having any questions pertaining to AVS. Patient stable at time of discharge. Patient denies SI, HI, and thoughts of self harm at time of discharge. All personal belongings returned to patient. Discharge prescriptions sent to [facility] pharmacy in [facility] in [place], [place] via electronic communication. Psych evaluation, history and physical, AVS, and discharge summary faxed to next level of care- home.     Annamarie Mejias  10/18/2017  10:51 AM

## 2017-10-18 NOTE — PLAN OF CARE
Face to face end of shift report received from Pam VEGA RN. Rounding completed. Patient observed sleeping in bed.    Giovanna Murphy  10/17/2017  11:44 PM

## 2017-10-18 NOTE — PLAN OF CARE
Face to face end of shift report received from Giovanna Murphy RN. Rounding completed. Patient observed in [facility].     Annamarie Mejias  10/18/2017  7:41 AM

## 2018-02-09 ENCOUNTER — DOCUMENTATION ONLY (OUTPATIENT)
Dept: FAMILY MEDICINE | Facility: OTHER | Age: 23
End: 2018-02-09

## 2018-02-09 PROBLEM — H66.90 OTITIS MEDIA, CHRONIC: Status: ACTIVE | Noted: 2018-02-09

## 2018-02-09 PROBLEM — R15.9 ENCOPRESIS: Status: ACTIVE | Noted: 2018-02-09

## 2018-02-11 ENCOUNTER — HEALTH MAINTENANCE LETTER (OUTPATIENT)
Age: 23
End: 2018-02-11

## 2018-02-16 ENCOUNTER — OFFICE VISIT (OUTPATIENT)
Dept: FAMILY MEDICINE | Facility: OTHER | Age: 23
End: 2018-02-16
Attending: FAMILY MEDICINE
Payer: COMMERCIAL

## 2018-02-16 VITALS
WEIGHT: 150 LBS | DIASTOLIC BLOOD PRESSURE: 62 MMHG | SYSTOLIC BLOOD PRESSURE: 106 MMHG | HEART RATE: 60 BPM | BODY MASS INDEX: 15.31 KG/M2

## 2018-02-16 DIAGNOSIS — T14.91XA SUICIDAL BEHAVIOR WITH ATTEMPTED SELF-INJURY (H): ICD-10-CM

## 2018-02-16 DIAGNOSIS — R55 SYNCOPE AND COLLAPSE: Primary | ICD-10-CM

## 2018-02-16 DIAGNOSIS — F33.1 MAJOR DEPRESSIVE DISORDER, RECURRENT EPISODE, MODERATE (H): ICD-10-CM

## 2018-02-16 LAB
ANION GAP SERPL CALCULATED.3IONS-SCNC: 7 MMOL/L (ref 3–14)
BUN SERPL-MCNC: 14 MG/DL (ref 7–25)
CALCIUM SERPL-MCNC: 9.8 MG/DL (ref 8.6–10.3)
CHLORIDE SERPL-SCNC: 104 MMOL/L (ref 98–107)
CO2 SERPL-SCNC: 29 MMOL/L (ref 21–31)
CREAT SERPL-MCNC: 0.97 MG/DL (ref 0.7–1.3)
GFR SERPL CREATININE-BSD FRML MDRD: >90 ML/MIN/1.7M2
GLUCOSE SERPL-MCNC: 79 MG/DL (ref 70–105)
POTASSIUM SERPL-SCNC: 4.1 MMOL/L (ref 3.5–5.1)
SODIUM SERPL-SCNC: 140 MMOL/L (ref 134–144)

## 2018-02-16 PROCEDURE — 80048 BASIC METABOLIC PNL TOTAL CA: CPT | Performed by: FAMILY MEDICINE

## 2018-02-16 PROCEDURE — G0463 HOSPITAL OUTPT CLINIC VISIT: HCPCS

## 2018-02-16 PROCEDURE — 36415 COLL VENOUS BLD VENIPUNCTURE: CPT | Performed by: FAMILY MEDICINE

## 2018-02-16 PROCEDURE — 99214 OFFICE O/P EST MOD 30 MIN: CPT | Performed by: FAMILY MEDICINE

## 2018-02-16 ASSESSMENT — ENCOUNTER SYMPTOMS
UNEXPECTED WEIGHT CHANGE: 0
ABDOMINAL PAIN: 0
SLEEP DISTURBANCE: 0
NERVOUS/ANXIOUS: 1
PALPITATIONS: 1
CHEST TIGHTNESS: 0
DYSPHORIC MOOD: 0
SHORTNESS OF BREATH: 0
FEVER: 0

## 2018-02-16 ASSESSMENT — PAIN SCALES - GENERAL: PAINLEVEL: NO PAIN (0)

## 2018-02-16 NOTE — LETTER
February 22, 2018      Ivan Riley  [address] [place] [id number]        Dear Ivan,     The recent labs are all normal.    Results for orders placed or performed in visit on 02/16/18   Basic metabolic panel  (Ca, Cl, CO2, Creat, Gluc, K, Na, BUN)   Result Value Ref Range    Sodium 140 134 - 144 mmol/L    Potassium 4.1 3.5 - 5.1 mmol/L    Chloride 104 98 - 107 mmol/L    Carbon Dioxide 29 21 - 31 mmol/L    Anion Gap 7 3 - 14 mmol/L    Glucose 79 70 - 105 mg/dL    Urea Nitrogen 14 7 - 25 mg/dL    Creatinine 0.97 0.70 - 1.30 mg/dL    GFR Estimate >90 >60 mL/min/1.7m2    GFR Estimate If Black >90 >60 mL/min/1.7m2    Calcium 9.8 8.6 - 10.3 mg/dL           Sincerely,        Ton Baca MD

## 2018-02-16 NOTE — MR AVS SNAPSHOT
"              After Visit Summary   2/16/2018    Ivan Riley    MRN: [id number]           Patient Information     Date Of Birth          1995        Visit Information        Provider Department      2/16/2018 3:15 PM Ton Baca MD [facility]        Today's Diagnoses     Syncope and collapse    -  1    Major depressive disorder, recurrent episode, moderate (H)        Suicidal behavior with attempted self-injury           Follow-ups after your visit        Follow-up notes from your care team     Return in about 4 weeks (around 3/16/2018).      Future tests that were ordered for you today     Open Future Orders        Priority Expected Expires Ordered    Zio Patch 48 Hours Routine  4/2/2018 2/16/2018    Echocardiogram Complete Routine  2/16/2019 2/16/2018            Who to contact     If you have questions or need follow up information about today's clinic visit or your schedule please contact [facility] directly at [phone].  Normal or non-critical lab and imaging results will be communicated to you by [employer] Bur[employer]art, letter or phone within 4 business days after the clinic has received the results. If you do not hear from us within 7 days, please contact the clinic through [employer] Bur[employer]art or phone. If you have a critical or abnormal lab result, we will notify you by phone as soon as possible.  Submit refill requests through [employer] or call your pharmacy and they will forward the refill request to us. Please allow 3 business days for your refill to be completed.          Additional Information About Your Visit        Buru Buruhart Information     [employer]k lets you send messages to your doctor, view your test results, renew your prescriptions, schedule appointments and more. To sign up, go to [email or web address] . Click on \"Log in\" on the left side of the screen, which will take you to the Welcome page. Then click on \"Sign up Now\" on the right side of the page.     You will be " asked to enter the access code listed below, as well as some personal information. Please follow the directions to create your username and password.     Your access code is: [email or web address]  Expires: 2018  4:10 PM     Your access code will  in 90 days. If you need help or a new code, please call your [facility] clinic or [phone].        Care EveryWhere ID     This is your Care EveryWhere ID. This could be used by other organizations to access your [facility] medical records  [id number]        Your Vitals Were     Pulse BMI (Body Mass Index)                60 15.31 kg/m2           Blood Pressure from Last 3 Encounters:   18 106/62   10/14/15 96/60   10/29/13 100/60    Weight from Last 3 Encounters:   18 150 lb (68 kg)   10/14/15 148 lb (67.1 kg) (44 %)*   10/29/13 143 lb (64.9 kg) (53 %)*     * Growth percentiles are based on [facility] 2-20 Years data.                 Where to get your medicines      These medications were sent to [facility] [id number] [facility] - [place], [place] - [address] [facility] [address]     Phone:  [phone]     FLUoxetine 20 MG capsule          Primary Care Provider Office Phone # Fax #    Ton Baca [phone]       [address] [facility] [address]        Equal Access to Services     [facility] Hospital[facility] BETH : Hadii aashish jenseno Sokristyn, waaxda luqadaha, qaybta kaalbishnu lemons. So [facility] [phone].    ATENCIÓN: Si habla español, tiene a yoo disposición servicios gratuitos de asistencia lingüística. Llame al [phone].    We comply with applicable federal civil rights laws and [place] laws. We do not discriminate on the basis of race, color, national origin, age, disability, sex, sexual orientation, or gender identity.            Thank you!     Thank you for choosing [facility]  for your care. Our goal is always to provide you with excellent care.  Hearing back from our patients is one way we can continue to improve our services. Please take a few minutes to complete the written survey that you may receive in the mail after your visit with us. Thank you!             Your Updated Medication List - Protect others around you: Learn how to safely use, store and throw away your medicines at www.disposemymeds.org.          This list is accurate as of 2/16/18  4:10 PM.  Always use your most recent med list.                   Brand Name Dispense Instructions for use Diagnosis    FLUoxetine 20 MG capsule    PROzac    90 capsule    Take 1 capsule (20 mg) by mouth daily    Major depressive disorder, recurrent episode, moderate (H)

## 2018-02-16 NOTE — PROGRESS NOTES
SUBJECTIVE:   Ivan Riley is a 22 year old male who presents to clinic today for the following health issues: heart and meds    HPI Comments: He is [employer] the [employer] and now has a heart issue.  He had a recent admission to a psychiatric hospital, after a suicide attempt.  Says he was in a fib, and had low blood pressure, 90's/40's.  Had overdosed on ibuprofen, 40,000 milligrams total.  He is very far in debt, but is working currently.  Now feels depressed just once in a while.  Was given prozac, but he stopped them once the bottle ran out.  He says they were not really working.  Spent about 1 week hospitalized.  He says the suicide was triggered by a court case over his son, where it sounds like he had to pay back child support of about $2500.  He had lost all contact with his 2 year old son since June.  He says the mother will not allow contact with the son currently.    I reviewed the ed notes and the hospital notes.  There is no documentation of anything other than normal sinus rhythm.  He says about 2-3 times a day he will fell palpitations, lasting for a short time, under 5 minutes.  Uses average amounts of caffeine, 1 cup coffee and 1 pop daily.  He says he had spells at age 10 or so with loss of consciousness.  Was hospitalized several times with this as a child.  He had passed out while exercising when he was in high school, but not for at least 3 years now.  5 days ago he ran 2 miles in [place] [employer] without passing out.        Patient Active Problem List    Diagnosis Date Noted     Encopresis 02/09/2018     Priority: Medium     Overview:   intermittent       Otitis media, chronic 02/09/2018     Priority: Medium     Suicidal behavior 10/14/2017     Priority: Medium     Fracture of maxilla (H) 10/29/2013     Priority: Medium     Orbit fracture, right (H) 10/29/2013     Priority: Medium     Fracture of zygoma (H) 10/29/2013     Priority: Medium     Cardiac conduction disorder 11/12/2010     Priority:  Medium     Hypotension 11/12/2010     Priority: Medium     Injury 10/20/2010     Priority: Medium     Syncope and collapse 10/20/2010     Priority: Medium     Past Surgical History:   Procedure Laterality Date     MYRINGOTOMY, INSERT TUBE, COMBINED      No Comments Provided     TONSILLECTOMY, ADENOIDECTOMY, COMBINED      No Comments Provided     Current Outpatient Prescriptions   Medication Sig Dispense Refill     FLUoxetine (PROZAC) 20 MG capsule Take 1 capsule (20 mg) by mouth daily 90 capsule 3     [DISCONTINUED] FLUoxetine (PROZAC) 20 MG capsule Take 1 capsule (20 mg) by mouth daily 30 capsule 0     No Known Allergies    Review of Systems   Constitutional: Negative for fever and unexpected weight change.   Respiratory: Negative for chest tightness and shortness of breath.    Cardiovascular: Positive for palpitations. Negative for chest pain.   Gastrointestinal: Negative for abdominal pain.   Psychiatric/Behavioral: Negative for dysphoric mood, self-injury, sleep disturbance and suicidal ideas. The patient is nervous/anxious.         OBJECTIVE:     /62 (BP Location: Right arm, Patient Position: Sitting, Cuff Size: Adult Regular)  Pulse 60  Wt 150 lb (68 kg)  BMI 15.31 kg/m2  Body mass index is 15.31 kg/(m^2).  Physical Exam   Constitutional: He is oriented to person, place, and time. He appears well-developed. No distress.   Cardiovascular: Normal rate, regular rhythm and normal heart sounds.  Exam reveals no gallop and no friction rub.    No murmur heard.  Pulmonary/Chest: Effort normal. No respiratory distress. He has no wheezes. He has no rales.   Abdominal: Soft. Bowel sounds are normal.   Neurological: He is alert and oriented to person, place, and time.   Skin: He is not diaphoretic.   Psychiatric: He has a normal mood and affect. His behavior is normal. Thought content normal.       Diagnostic Test Results:  none     ASSESSMENT/PLAN:         1. Major depressive disorder, recurrent episode,  moderate (H)  This appears to have improved in that he is no longer suicidal, but I am concerned he is not on his meds and appears to have a somewhat limited ability to cope with adversity.  Will resume the prozac, and I want him to see me back again in 4 weeks or so  - FLUoxetine (PROZAC) 20 MG capsule; Take 1 capsule (20 mg) by mouth daily  Dispense: 90 capsule; Refill: 3    2. Syncope and collapse  It sounds like this had resolved, and I do not see a clear explanation in the chart here.  It would be unusual for him to have HCM without progressive symptoms (was able to run 2 miles in under 13 minutes in the [employer]).  Since he is reporting some palpitations now, will proceed with the cardiac monitor along with the echo.  Labs pending, given the possible renal damage from the NSAIDS and possibility for hyperkalemia.    - Zio Patch 48 Hours; Future  - Echocardiogram Complete; Future    3. Suicidal behavior with attempted self-injury  Stabilized currently        Ton Baca MD  [facility] AND [facility]

## 2018-02-20 ENCOUNTER — HOSPITAL ENCOUNTER (OUTPATIENT)
Dept: RESPIRATORY THERAPY | Facility: OTHER | Age: 23
End: 2018-02-20
Attending: FAMILY MEDICINE
Payer: COMMERCIAL

## 2018-02-20 DIAGNOSIS — R55 SYNCOPE AND COLLAPSE: ICD-10-CM

## 2018-02-26 ENCOUNTER — DOCUMENTATION ONLY (OUTPATIENT)
Dept: FAMILY MEDICINE | Facility: OTHER | Age: 23
End: 2018-02-26

## 2018-03-01 ENCOUNTER — APPOINTMENT (OUTPATIENT)
Dept: GENERAL RADIOLOGY | Facility: OTHER | Age: 23
End: 2018-03-01
Attending: FAMILY MEDICINE
Payer: COMMERCIAL

## 2018-03-01 ENCOUNTER — HOSPITAL ENCOUNTER (EMERGENCY)
Facility: OTHER | Age: 23
Discharge: HOME | End: 2018-03-01
Attending: FAMILY MEDICINE | Admitting: FAMILY MEDICINE
Payer: COMMERCIAL

## 2018-03-01 VITALS
BODY MASS INDEX: 19.88 KG/M2 | SYSTOLIC BLOOD PRESSURE: 113 MMHG | HEIGHT: 73 IN | DIASTOLIC BLOOD PRESSURE: 46 MMHG | RESPIRATION RATE: 16 BRPM | OXYGEN SATURATION: 98 % | WEIGHT: 150 LBS | TEMPERATURE: 98.2 F | HEART RATE: 68 BPM

## 2018-03-01 DIAGNOSIS — S46.911A SHOULDER STRAIN, RIGHT, INITIAL ENCOUNTER: ICD-10-CM

## 2018-03-01 PROCEDURE — 99283 EMERGENCY DEPT VISIT LOW MDM: CPT | Mod: 25 | Performed by: FAMILY MEDICINE

## 2018-03-01 PROCEDURE — 99282 EMERGENCY DEPT VISIT SF MDM: CPT | Mod: Z6 | Performed by: FAMILY MEDICINE

## 2018-03-01 PROCEDURE — 73030 X-RAY EXAM OF SHOULDER: CPT | Mod: RT

## 2018-03-01 PROCEDURE — 73010 X-RAY EXAM OF SHOULDER BLADE: CPT | Mod: RT

## 2018-03-01 ASSESSMENT — ENCOUNTER SYMPTOMS
HEADACHES: 0
FLANK PAIN: 0
SORE THROAT: 0
SEIZURES: 0
TROUBLE SWALLOWING: 0
JOINT SWELLING: 0
DIZZINESS: 0
ABDOMINAL PAIN: 0
VOMITING: 0
LIGHT-HEADEDNESS: 0
NECK PAIN: 0
DIAPHORESIS: 0
BRUISES/BLEEDS EASILY: 0
AGITATION: 0
WOUND: 0
NAUSEA: 0
BACK PAIN: 0
FACIAL SWELLING: 0
CONFUSION: 0

## 2018-03-01 NOTE — ED AVS SNAPSHOT
[facility]    [address]    [place] [address]    Phone:  [phone]    Fax:  [phone]                                       Ivan Riley   MRN: [id number]    Department:  [facility]   Date of Visit:  3/1/2018           Patient Information     Date Of Birth          1995        Your diagnoses for this visit were:     Shoulder strain, right, initial encounter        You were seen by Lobito Fang MD.        Discharge Instructions       Shoulder immobilizer - may remove for showering and increasing range of motion over time.  Ibuprofen for pain.  Recheck if not a lot better in 10 days and clearing up in 3-4 weeks.    Future Appointments        Provider Department Dept Phone Center    3/2/2018 4:00 PM Ton Baca MD [facility] [phone] [facility]      24 Hour Appointment Hotline       To make an appointment at any [facility], call [phone] ([phone]). If you don't have a family doctor or clinic, we will help you find one. [facility] clinics are conveniently located to serve the needs of you and your family.             Review of your medicines      Our records show that you are taking the medicines listed below. If these are incorrect, please call your family doctor or clinic.        Dose / Directions Last dose taken    FLUoxetine 20 MG capsule   Commonly known as:  PROzac   Dose:  20 mg   Quantity:  90 capsule        Take 1 capsule (20 mg) by mouth daily   Refills:  3                Procedures and tests performed during your visit     XR Scapula Right    XR Shoulder Right 2 Views      Orders Needing Specimen Collection     None      Pending Results     No orders found from 2/27/2018 to 3/2/2018.            Pending Culture Results     No orders found from 2/27/2018 to 3/2/2018.            Thank you for choosing [facility]       Thank you for choosing [facility] for your care. Our goal is always to  "provide you with excellent care. Hearing back from our patients is one way we can continue to improve our services. Please take a few minutes to complete the written survey that you may receive in the mail after you visit with us. Thank you!        [employer] Information     [employer] lets you send messages to your doctor, view your test results, renew your prescriptions, schedule appointments and more. To sign up, go to [email or web address] . Click on \"Log in\" on the left side of the screen, which will take you to the Welcome page. Then click on \"Sign up Now\" on the right side of the page.     You will be asked to enter the access code listed below, as well as some personal information. Please follow the directions to create your username and password.     Your access code is: [email or web address]  Expires: 2018  4:10 PM     Your access code will  in 90 days. If you need help or a new code, please call your [facility] clinic or [phone].        Care EveryWhere ID     This is your Care EveryWhere ID. This could be used by other organizations to access your [facility] medical records  [id number]        Equal Access to Services     LINH GORDON : Hadii aashish Joyce, wajamesonda saskia, qacindy kaalmalamar borrego, bishnu arias. So [facility] [phone].    ATENCIÓN: Si habla español, tiene a yoo disposición servicios gratuitos de asistencia lingüística. Gracie al [phone].    We comply with applicable federal civil rights laws and [place] laws. We do not discriminate on the basis of race, color, national origin, age, disability, sex, sexual orientation, or gender identity.            After Visit Summary       This is your record. Keep this with you and show to your community pharmacist(s) and doctor(s) at your next visit.                  "

## 2018-03-01 NOTE — ED AVS SNAPSHOT
[facility]    [address]    [place] [address]    Phone:  [phone]    Fax:  [phone]                                       Ivan Riley   MRN: [id number]    Department:  [facility]   Date of Visit:  3/1/2018           After Visit Summary Signature Page     I have received my discharge instructions, and my questions have been answered. I have discussed any challenges I see with this plan with the nurse or doctor.    ..........................................................................................................................................  Patient/Patient Representative Signature      ..........................................................................................................................................  Patient Representative Print Name and Relationship to Patient    ..................................................               ................................................  Date                                            Time    ..........................................................................................................................................  Reviewed by Signature/Title    ...................................................              ..............................................  Date                                                            Time

## 2018-03-02 NOTE — ED NOTES
Spoke with patient, patient denies thoughts of harming himself or having suicidal thoughts at this time.   Stated that he had talked to [facility] and had discussed meeting them tomorrow morning.   Mom and friend present at this time.

## 2018-03-02 NOTE — ED NOTES
Pt states that he slipped and fell on black ice last night and fell on his right shoulder.  Pt states that he cannot lift his arm and thought he should come in since it has not gotten any better from 24 hours ago.

## 2018-03-02 NOTE — ED PROVIDER NOTES
History   No chief complaint on file.    HPI  Ivan Riley is a 22 year old male who slipped on the ice yesterday  With pain ant. More with abduction and forward flexion and  Lateral right scapula,    Bumped head, no  LOC, neuro. Symptoms or concern.  No  Abd,  Pelvic, back, neck or leg injury  Problem List:    Patient Active Problem List    Diagnosis Date Noted     Encopresis 02/09/2018     Priority: Medium     Overview:   intermittent       Otitis media, chronic 02/09/2018     Priority: Medium     Suicidal behavior 10/14/2017     Priority: Medium     Fracture of maxilla (H) 10/29/2013     Priority: Medium     Orbit fracture, right (H) 10/29/2013     Priority: Medium     Fracture of zygoma (H) 10/29/2013     Priority: Medium     Cardiac conduction disorder 11/12/2010     Priority: Medium     Hypotension 11/12/2010     Priority: Medium     Injury 10/20/2010     Priority: Medium     Syncope and collapse 10/20/2010     Priority: Medium        Past Medical History:    Past Medical History:   Diagnosis Date     Orthostatic hypotension        Past Surgical History:    Past Surgical History:   Procedure Laterality Date     MYRINGOTOMY, INSERT TUBE, COMBINED      No Comments Provided     TONSILLECTOMY, ADENOIDECTOMY, COMBINED      No Comments Provided       Family History:    No family history on file.    Social History:  Marital Status:  Single [1]  Social History   Substance Use Topics     Smoking status: Former Smoker     Types: Cigarettes     Smokeless tobacco: Former User      Comment: Quit smoking: Patient uses VAP     Alcohol use Yes      Comment: socially        Medications:      FLUoxetine (PROZAC) 20 MG capsule         Review of Systems   Constitutional: Negative for diaphoresis.   HENT: Negative for ear discharge, ear pain, facial swelling, nosebleeds, sore throat and trouble swallowing.    Eyes: Negative for visual disturbance.   Cardiovascular: Negative for chest pain.   Gastrointestinal: Negative for  "abdominal pain, nausea and vomiting.   Genitourinary: Negative for flank pain.   Musculoskeletal: Negative for back pain, joint swelling and neck pain.   Skin: Negative for rash and wound.   Allergic/Immunologic: Negative for immunocompromised state.   Neurological: Negative for dizziness, seizures, syncope, light-headedness and headaches.   Hematological: Does not bruise/bleed easily.   Psychiatric/Behavioral: Negative for agitation, behavioral problems and confusion.       Physical Exam   BP: 113/46  Pulse: 68  Temp: 98.2  F (36.8  C)  Resp: 16  Height: 185.4 cm (6' 1\")  Weight: 68 kg (150 lb)  SpO2: 98 %      Physical Exam   Constitutional: He is oriented to person, place, and time. He appears well-developed and well-nourished. No distress.   HENT:   Head: Normocephalic and atraumatic.   Eyes: Pupils are equal, round, and reactive to light.   Neck: Normal range of motion. Neck supple.   Cardiovascular: Regular rhythm.    Rate 68   Pulmonary/Chest:   Rate 16   Musculoskeletal:   Tenderness  More  Along coracoid process and  Short head of biceps tendon and lateral scapula   Neurological: He is alert and oriented to person, place, and time.   Skin: Skin is warm and dry. He is not diaphoretic.   Psychiatric: He has a normal mood and affect. His behavior is normal. Judgment and thought content normal.   Concern about mood,  [facility] talked with him  And he has plans to follow up tomorrow.   Nursing note and vitals reviewed.      ED Course     ED Course     Procedures          Results for orders placed or performed during the hospital encounter of 03/01/18   XR Scapula Right    Narrative    PROCEDURE:  XR SCAPULA RT    HISTORY: fell on ice yesterda and right scapular pain;     COMPARISON:  None.    TECHNIQUE:  2 views of the right scapula were obtained.    FINDINGS:  No fracture or dislocation is identified. The joint spaces  are preserved.        Impression    IMPRESSION: No acute fracture.      SHAWN SALAZAR MD   XR " Shoulder Right 2 Views    Narrative    PROCEDURE:  XR SHOULDER 2 VIEW RIGHT    HISTORY: fell on right shoulder, pain around coracoid process and  scapula;     COMPARISON:  None.    TECHNIQUE:  3 views of the right shoulder were obtained.    FINDINGS:  No fracture or dislocation is identified. The joint spaces  are preserved.  Acromion clavicular and the glenohumeral articulations  are normally aligned.      Impression    IMPRESSION: No acute fracture.      SHAWN SALAZAR MD                    Labs Ordered and Resulted from Time of ED Arrival Up to the Time of Departure from the ED - No data to display    Assessments & Plan (with Medical Decision Making)     I have reviewed the nursing notes.    I have reviewed the findings, diagnosis, plan and need for follow up with the patient.      Discharge Medication List as of 3/1/2018  9:10 PM          Final diagnoses:   Shoulder strain, right, initial encounter   shoulder immobilizer, ice, ibuprofen,  Progressive ROM.  Recheck if not a lot better in  10 days and clearing up in 3-4 weeks.    3/1/2018   [facility]     Lobito Fang MD  03/01/18 [id number]

## 2018-03-02 NOTE — DISCHARGE INSTRUCTIONS
Shoulder immobilizer - may remove for showering and increasing range of motion over time.  Ibuprofen for pain.  Recheck if not a lot better in 10 days and clearing up in 3-4 weeks.

## 2018-03-23 ENCOUNTER — HOSPITAL ENCOUNTER (OUTPATIENT)
Dept: CARDIOLOGY | Facility: OTHER | Age: 23
Discharge: HOME | End: 2018-03-23
Attending: FAMILY MEDICINE | Admitting: FAMILY MEDICINE
Payer: COMMERCIAL

## 2018-03-23 DIAGNOSIS — R55 SYNCOPE AND COLLAPSE: ICD-10-CM

## 2018-03-23 PROCEDURE — 93306 TTE W/DOPPLER COMPLETE: CPT | Mod: 26 | Performed by: INTERNAL MEDICINE

## 2018-03-23 PROCEDURE — 93306 TTE W/DOPPLER COMPLETE: CPT

## 2018-12-19 NOTE — PLAN OF CARE
Problem: Patient Care Overview  Goal: Individualization & Mutuality  Pt appeared to be sleeping throughout this shift, normal respirations and position changes noted.        WDL

## 2019-07-24 ENCOUNTER — TELEPHONE (OUTPATIENT)
Dept: OBGYN | Facility: OTHER | Age: 24
End: 2019-07-24

## 2019-07-24 DIAGNOSIS — Z20.2 EXPOSURE TO CHLAMYDIA: Primary | ICD-10-CM

## 2019-07-24 RX ORDER — AZITHROMYCIN 500 MG/1
1000 TABLET, FILM COATED ORAL DAILY
Qty: 2 TABLET | Refills: 0 | Status: SHIPPED | OUTPATIENT
Start: 2019-07-24 | End: 2019-07-25

## 2019-07-24 NOTE — TELEPHONE ENCOUNTER
Call received from patient. His partner was recently treated for Chlamydia by Dr. Mackenzie Sahni MD and he is seeking treatment as well. Patient verified that he has no known allergies and states he would like prescription sent to [facility] in [employer]. Azithromycin 1000 mg x1 dose sent per verbal order from Dr. Mackenzie Sahni MD.    Jeannie Moreau RN...................7/24/2019 11:03 AM

## 2022-07-26 NOTE — PROGRESS NOTES
"[facility]  Psychiatric Progress Note      Impression:   He has been going to as many groups that he can. He is social with others out in the lounge. He has now had two doses of prozac with no side effects. He slept well. He denies any SI. His mother came to visit yesterday and  Visit went well. He still is hopeless. In his chart from ER it says that he reported he was upset that he was still alive. He denies that he feels this way.       Educated regarding medication indications, risks, benefits, side effects, contraindications and possible interactions. Verbally expressed understanding.        DIagnoses:      Mdd, reccurent, severe without psychotic features  Adjustment disorder with depression and anxiety      Attestation:  Patient has been evaluated by me, Brittney Jones NP, separate from the house staff team          Interim History:   The patient's care was discussed with the treatment team and chart notes were reviewed.          Medications:       FLUoxetine  20 mg Oral Daily              10 point ROS negative        Allergies:   No Known Allergies         Psychiatric Examination:   BP (!) 114/46  Pulse 64  Temp 96  F (35.6  C) (Tympanic)  Resp 12  Ht 2.108 m (6' 11\")  Wt 66.6 kg (146 lb 13.2 oz)  SpO2 98%  BMI 14.98 kg/m2  Weight is 146 lbs 13.22 oz  Body mass index is 14.98 kg/(m^2).    Appearance:  awake, alert and adequately groomed  Attitude:  cooperative  Eye Contact:  fair, better  Mood:  better  Affect:  intensity is flat  Speech:  clear, coherent  Psychomotor Behavior:  no evidence of tardive dyskinesia, dystonia, or tics  Thought Process:  logical and goal oriented  Associations:  no loose associations  Thought Content:  no evidence of suicidal ideation or homicidal ideation and no evidence of psychotic thought  Insight:  fair  Judgment:  fair  Oriented to:  time, person, and place  Attention Span and Concentration:  intact  Recent and Remote Memory:  intact  Fund of " Knowledge: appropriate  Muscle Strength and Tone: normal  Gait and Station: Normal           Labs:   No results found for this or any previous visit.             Plan:   No change in medications today  Will call his mother louis today         [facility] Pregnancy And Lactation Text: This medication is Pregnancy Category C and it isn't known if it is safe during pregnancy. It is unknown if it is excreted in breast milk.

## 2024-04-08 ENCOUNTER — OFFICE VISIT (OUTPATIENT)
Dept: FAMILY MEDICINE | Facility: OTHER | Age: 29
End: 2024-04-08

## 2024-04-08 VITALS
OXYGEN SATURATION: 99 % | DIASTOLIC BLOOD PRESSURE: 72 MMHG | TEMPERATURE: 99.2 F | SYSTOLIC BLOOD PRESSURE: 118 MMHG | WEIGHT: 143 LBS | HEIGHT: 72 IN | BODY MASS INDEX: 19.37 KG/M2 | HEART RATE: 72 BPM | RESPIRATION RATE: 17 BRPM

## 2024-04-08 DIAGNOSIS — J02.9 SORE THROAT: ICD-10-CM

## 2024-04-08 DIAGNOSIS — J02.0 STREP THROAT: Primary | ICD-10-CM

## 2024-04-08 LAB — GROUP A STREP BY PCR: DETECTED

## 2024-04-08 PROCEDURE — 87651 STREP A DNA AMP PROBE: CPT | Mod: ZL | Performed by: STUDENT IN AN ORGANIZED HEALTH CARE EDUCATION/TRAINING PROGRAM

## 2024-04-08 PROCEDURE — 99203 OFFICE O/P NEW LOW 30 MIN: CPT | Performed by: STUDENT IN AN ORGANIZED HEALTH CARE EDUCATION/TRAINING PROGRAM

## 2024-04-08 RX ORDER — AMOXICILLIN 400 MG/5ML
500 POWDER, FOR SUSPENSION ORAL 2 TIMES DAILY
Qty: 125 ML | Refills: 0 | Status: SHIPPED | OUTPATIENT
Start: 2024-04-08 | End: 2024-04-18

## 2024-04-08 ASSESSMENT — PAIN SCALES - GENERAL: PAINLEVEL: EXTREME PAIN (8)

## 2024-04-08 NOTE — PROGRESS NOTES
Assessment & Plan     (J02.0) Strep throat  (primary encounter diagnosis)    Comment: Strep pharyngitis.  No signs of peritonsillar abscess, tonsils are surgically absent.  Vital signs are stable.  He is continuing to tolerate oral intake.    Plan: amoxicillin (AMOXIL) 400 MG/5ML suspension          Plan to treat with liquid amoxicillin per his preference, 500 mg twice a day for 10 days.  Discussed changing toothbrush and washing water bottle.  Follow-up with primary care for persisting symptoms.  Return to rapid clinic or ER for worsening or changing symptoms.  He is comfortable and agreeable with this plan.    (J02.9) Sore throat  Comment: Strep pharyngitis.  Discussed testing for mono, at this time plan to treat strep pharyngitis.  Plan: Group A Streptococcus PCR Throat Swab            Kassy Love is a 28 year old, presenting for the following health issues:  Pharyngitis, Chills, and Fever    HPI     Patient presents today with sore throat, fever, chills.  He states it was a couple weeks ago, had resolved over the last couple days has gotten much worse.  He does not like to take other medications.  He has been drinking water, warm chicken broth.  Notes exposure to strep.      Review of Systems  Constitutional, HEENT, cardiovascular, pulmonary, gi and gu systems are negative, except as otherwise noted.        Objective    /72   Pulse 72   Temp 99.2  F (37.3  C) (Tympanic)   Resp 17   Ht 1.829 m (6')   Wt 64.9 kg (143 lb)   SpO2 99%   BMI 19.39 kg/m    Body mass index is 19.39 kg/m .      Physical Exam   GENERAL: alert and no distress  HEENT: Pupils equal and reactive to light, tympanic membranes pearly gray bilaterally, pharynx with moderate erythema, petechiae, tonsils surgically absent, uvula midline  NECK: Shotty anterior cervical adenopathy, no asymmetry, masses, or scars  RESP: lungs clear to auscultation - no rales, rhonchi or wheezes  CV: regular rate and rhythm, normal S1  S2  ABDOMEN: soft, nontender, no hepatosplenomegaly, no masses and bowel sounds normal  MS: no gross musculoskeletal defects noted, no edema      Results for orders placed or performed in visit on 04/08/24   Group A Streptococcus PCR Throat Swab     Status: Abnormal    Specimen: Throat; Swab   Result Value Ref Range    Group A strep by PCR Detected (A) Not Detected    Narrative    The Xpert Xpress Strep A test, performed on the [employer], is a rapid, qualitative in vitro diagnostic test for the detection of Streptococcus pyogenes (Group A ß-hemolytic Streptococcus, Strep A) in throat swab specimens from patients with signs and symptoms of pharyngitis. The Xpert Xpress Strep A test can be used as an aid in the diagnosis of Group A Streptococcal pharyngitis. The assay is not intended to monitor treatment for Group A Streptococcus infections. The Xpert Xpress Strep A test utilizes an automated real-time polymerase chain reaction (PCR) to detect Streptococcus pyogenes DNA.           Signed Electronically by: Syndey Mo PA-C

## 2024-04-08 NOTE — LETTER
April 8, 2024      Ivan Riley  [address] [place] [id number]        To Whom It May Concern:    Ivan Riley  was seen on 4/8/24.  Please excuse him from work as needed this week due to illness.         Sincerely,        Sydney Mo PA-C

## 2024-04-08 NOTE — PATIENT INSTRUCTIONS
Sore throat    Strep test is pending, will call with results.    Tea with honey.    Ice water.     Tylenol if needed.    Follow up as needed.

## 2024-04-08 NOTE — NURSING NOTE
Chief Complaint   Patient presents with    Pharyngitis    Chills    Fever     Patient here with a sore throat, difficuty swallowing and fever/chills x2 days.       Initial /72   Pulse 72   Temp 99.2  F (37.3  C) (Tympanic)   Resp 17   Ht 1.829 m (6')   Wt 64.9 kg (143 lb)   SpO2 99%   BMI 19.39 kg/m   Estimated body mass index is 19.39 kg/m  as calculated from the following:    Height as of this encounter: 1.829 m (6').    Weight as of this encounter: 64.9 kg (143 lb).  Medication Review: complete    The next two questions are to help us understand your food security.  If you are feeling you need any assistance in this area, we have resources available to support you today.          4/8/2024   SDOH- Food Insecurity   Within the past 12 months, did you worry that your food would run out before you got money to buy more? N   Within the past 12 months, did the food you bought just not last and you didn t have money to get more? N         Health Care Directive:  Patient does not have a Health Care Directive or Living Will: Discussed advance care planning with patient; however, patient declined at this time.    Roxy Fletcher LPN